# Patient Record
Sex: MALE | Race: WHITE | NOT HISPANIC OR LATINO | Employment: OTHER | URBAN - NONMETROPOLITAN AREA
[De-identification: names, ages, dates, MRNs, and addresses within clinical notes are randomized per-mention and may not be internally consistent; named-entity substitution may affect disease eponyms.]

---

## 2017-03-11 ENCOUNTER — OFFICE VISIT (OUTPATIENT)
Dept: URGENT CARE | Facility: CLINIC | Age: 77
End: 2017-03-11
Payer: MEDICARE

## 2017-03-11 PROCEDURE — 99203 OFFICE O/P NEW LOW 30 MIN: CPT

## 2017-03-11 PROCEDURE — G0463 HOSPITAL OUTPT CLINIC VISIT: HCPCS

## 2017-03-11 PROCEDURE — 69210 REMOVE IMPACTED EAR WAX UNI: CPT

## 2024-04-08 ENCOUNTER — HOSPITAL ENCOUNTER (EMERGENCY)
Facility: HOSPITAL | Age: 84
Discharge: HOME | End: 2024-04-08
Attending: EMERGENCY MEDICINE
Payer: MEDICARE

## 2024-04-08 ENCOUNTER — APPOINTMENT (EMERGENCY)
Dept: RADIOLOGY | Facility: HOSPITAL | Age: 84
End: 2024-04-08
Payer: MEDICARE

## 2024-04-08 ENCOUNTER — APPOINTMENT (EMERGENCY)
Dept: CT IMAGING | Facility: HOSPITAL | Age: 84
End: 2024-04-08
Payer: MEDICARE

## 2024-04-08 VITALS
HEART RATE: 94 BPM | WEIGHT: 126.1 LBS | DIASTOLIC BLOOD PRESSURE: 72 MMHG | SYSTOLIC BLOOD PRESSURE: 160 MMHG | OXYGEN SATURATION: 98 % | TEMPERATURE: 98.6 F | RESPIRATION RATE: 15 BRPM

## 2024-04-08 DIAGNOSIS — S90.32XA CONTUSION OF LEFT FOOT, INITIAL ENCOUNTER: ICD-10-CM

## 2024-04-08 DIAGNOSIS — F03.90 DEMENTIA (HCC): ICD-10-CM

## 2024-04-08 DIAGNOSIS — M79.672 LEFT FOOT PAIN: Primary | ICD-10-CM

## 2024-04-08 LAB
ALBUMIN SERPL BCP-MCNC: 3.9 G/DL (ref 3.5–5)
ALP SERPL-CCNC: 71 U/L (ref 34–104)
ALT SERPL W P-5'-P-CCNC: 14 U/L (ref 7–52)
ANION GAP SERPL CALCULATED.3IONS-SCNC: 9 MMOL/L (ref 4–13)
AST SERPL W P-5'-P-CCNC: 22 U/L (ref 13–39)
ATRIAL RATE: 90 BPM
BASOPHILS # BLD AUTO: 0.02 THOUSANDS/ÂΜL (ref 0–0.1)
BASOPHILS NFR BLD AUTO: 0 % (ref 0–1)
BILIRUB SERPL-MCNC: 1.66 MG/DL (ref 0.2–1)
BUN SERPL-MCNC: 13 MG/DL (ref 5–25)
CALCIUM SERPL-MCNC: 9.1 MG/DL (ref 8.4–10.2)
CHLORIDE SERPL-SCNC: 103 MMOL/L (ref 96–108)
CO2 SERPL-SCNC: 28 MMOL/L (ref 21–32)
CREAT SERPL-MCNC: 0.66 MG/DL (ref 0.6–1.3)
EOSINOPHIL # BLD AUTO: 0.01 THOUSAND/ÂΜL (ref 0–0.61)
EOSINOPHIL NFR BLD AUTO: 0 % (ref 0–6)
ERYTHROCYTE [DISTWIDTH] IN BLOOD BY AUTOMATED COUNT: 12.4 % (ref 11.6–15.1)
GFR SERPL CREATININE-BSD FRML MDRD: 88 ML/MIN/1.73SQ M
GLUCOSE SERPL-MCNC: 113 MG/DL (ref 65–140)
HCT VFR BLD AUTO: 38.5 % (ref 36.5–49.3)
HGB BLD-MCNC: 12.7 G/DL (ref 12–17)
IMM GRANULOCYTES # BLD AUTO: 0.03 THOUSAND/UL (ref 0–0.2)
IMM GRANULOCYTES NFR BLD AUTO: 1 % (ref 0–2)
LYMPHOCYTES # BLD AUTO: 0.8 THOUSANDS/ÂΜL (ref 0.6–4.47)
LYMPHOCYTES NFR BLD AUTO: 12 % (ref 14–44)
MCH RBC QN AUTO: 36.3 PG (ref 26.8–34.3)
MCHC RBC AUTO-ENTMCNC: 33 G/DL (ref 31.4–37.4)
MCV RBC AUTO: 110 FL (ref 82–98)
MONOCYTES # BLD AUTO: 0.91 THOUSAND/ÂΜL (ref 0.17–1.22)
MONOCYTES NFR BLD AUTO: 14 % (ref 4–12)
NEUTROPHILS # BLD AUTO: 4.68 THOUSANDS/ÂΜL (ref 1.85–7.62)
NEUTS SEG NFR BLD AUTO: 73 % (ref 43–75)
NRBC BLD AUTO-RTO: 0 /100 WBCS
P AXIS: 93 DEGREES
PLATELET # BLD AUTO: 158 THOUSANDS/UL (ref 149–390)
PMV BLD AUTO: 10.5 FL (ref 8.9–12.7)
POTASSIUM SERPL-SCNC: 4 MMOL/L (ref 3.5–5.3)
PR INTERVAL: 174 MS
PROT SERPL-MCNC: 6.4 G/DL (ref 6.4–8.4)
QRS AXIS: 67 DEGREES
QRSD INTERVAL: 62 MS
QT INTERVAL: 364 MS
QTC INTERVAL: 445 MS
RBC # BLD AUTO: 3.5 MILLION/UL (ref 3.88–5.62)
SODIUM SERPL-SCNC: 140 MMOL/L (ref 135–147)
T WAVE AXIS: 80 DEGREES
VENTRICULAR RATE: 90 BPM
WBC # BLD AUTO: 6.45 THOUSAND/UL (ref 4.31–10.16)

## 2024-04-08 PROCEDURE — 36415 COLL VENOUS BLD VENIPUNCTURE: CPT

## 2024-04-08 PROCEDURE — 93010 ELECTROCARDIOGRAM REPORT: CPT | Performed by: INTERNAL MEDICINE

## 2024-04-08 PROCEDURE — 80053 COMPREHEN METABOLIC PANEL: CPT

## 2024-04-08 PROCEDURE — 73630 X-RAY EXAM OF FOOT: CPT

## 2024-04-08 PROCEDURE — 93005 ELECTROCARDIOGRAM TRACING: CPT

## 2024-04-08 PROCEDURE — 70450 CT HEAD/BRAIN W/O DYE: CPT

## 2024-04-08 PROCEDURE — 85025 COMPLETE CBC W/AUTO DIFF WBC: CPT

## 2024-04-08 PROCEDURE — 71045 X-RAY EXAM CHEST 1 VIEW: CPT

## 2024-04-08 PROCEDURE — 72125 CT NECK SPINE W/O DYE: CPT

## 2024-04-08 PROCEDURE — 73564 X-RAY EXAM KNEE 4 OR MORE: CPT

## 2024-04-08 NOTE — DISCHARGE INSTRUCTIONS
Immediately return to the emergency room if you experience any new or worsening symptoms or if the symptoms are lasting longer than expected.     Please get reestablished with a family doctor.

## 2024-04-08 NOTE — ED ATTENDING ATTESTATION
"4/8/2024  I, Petar Christian Jr, DO, saw and evaluated the patient. I have discussed the patient with the resident/non-physician practitioner and agree with the resident's/non-physician practitioner's findings, Plan of Care, and MDM as documented in the resident's/non-physician practitioner's note, except where noted. All available labs and Radiology studies were reviewed.  I was present for key portions of any procedure(s) performed by the resident/non-physician practitioner and I was immediately available to provide assistance.       At this point I agree with the current assessment done in the Emergency Department.  I have conducted an independent evaluation of this patient a history and physical is as follows:    I supervised the Advanced Practitioner.? I performed, in its entirety, the history component of the visit.  I agree with the Advanced Practitioner's note with the following additions/exceptions:      Patient is a 83-year-old male presenting secondary to a bruise of the left foot as well as left elbow.  The patient does have dementia, and the patient's wife went out to the store to find him sitting on the couch with the water still running in the sink as well as Not being able to describe what exactly happened.  Patient's wife then noted this morning that she saw these bruised areas.  The patient was brought to the ER for evaluation.  With regards to his mental status, patient's wife states that he is \"status quo.\"    Heart is regular rate and rhythm without clicks rubs or gallops and lungs are coarse without wheezes rales or rhonchi.  The patient is overall demented and is frail in appearance.    Left foot is tender over the dorsum and lateral aspect.    Petar Christian Jr, DO 04/08/24     Petar Christian D.O.     ED Course         Critical Care Time  Procedures      "

## 2024-04-08 NOTE — ED PROVIDER NOTES
History  Chief Complaint   Patient presents with    Knee Pain     Patient presents c/o of left knee pain. Patient unable to report if he hit his head. Patient has bruising to the left elbow however denies pain. Bruising and swelling present in the left foot. Patient's wife arrived at bedside, reporting she noticed left leg and arm bruising and swelling this morning. Wife reports the patient had an unwitnessed fall while she was away at [employer].     Patient is a 83-year-old male with relevant past medical history of anemia, Alzheimer's, hypertension, and hearing loss presenting via emergency medicine services with left foot pain. Wife is at the bedside and reports that she came home from [employer] yesterday and didn't notice anything different but her  was at home by himself while she was at [employer]. Her  woke up this morning and she noticed bruising and swelling of the patient's left foot and ankle. She thinks he fell yesterday while she was at [employer]. She thinks she would have noticed the foot bruising yesterday but he was wearing socks all day. Patient arrives with a chief complaint of mild left foot pain, bruising, and swelling. Patient is unsure how he fell but has a history of Alzheimer's. He is at baseline mental status according to his wife. He is alert and oriented to person, place, and event. He does not take any medications including anticoagulants. He does not think he hit his head but is not totally sure. Patient has not seen a doctor since 2014, but has been feeling well prior to this. Patient denies fever, chills, headache, dizziness, lightheadedness, numbness, tingling, weakness, visual changes, chest pain, shortness of breath, abdominal pain, nausea, vomiting, constipation, diarrhea, dysuria, or other urinary symptoms.       History provided by:  Patient and spouse   used: No        None       Past Medical History:   Diagnosis Date    Dementia (HCC)        History  reviewed. No pertinent surgical history.    History reviewed. No pertinent family history.  I have reviewed and agree with the history as documented.    E-Cigarette/Vaping     E-Cigarette/Vaping Substances     Social History     Tobacco Use    Smoking status: Never    Smokeless tobacco: Never   Substance Use Topics    Alcohol use: Yes     Comment: occasional    Drug use: Not Currently       Review of Systems   Constitutional:  Negative for chills and fever.   HENT:  Negative for congestion, ear pain, rhinorrhea and sore throat.    Eyes:  Negative for pain and visual disturbance.   Respiratory:  Negative for cough and shortness of breath.    Cardiovascular:  Negative for chest pain and palpitations.   Gastrointestinal:  Negative for abdominal pain, constipation, diarrhea, nausea and vomiting.   Genitourinary:  Negative for dysuria, frequency, hematuria and urgency.   Musculoskeletal:  Negative for arthralgias and back pain.        Left foot pain, bruising, and swelling   Skin:  Negative for color change and rash.   Neurological:  Negative for dizziness, seizures, syncope, light-headedness and headaches.   Psychiatric/Behavioral:  Negative for agitation and confusion.        Physical Exam  Physical Exam  Vitals and nursing note reviewed.   Constitutional:       General: He is not in acute distress.     Appearance: He is well-developed.   HENT:      Head: Normocephalic and atraumatic.   Eyes:      Conjunctiva/sclera: Conjunctivae normal.   Cardiovascular:      Rate and Rhythm: Normal rate and regular rhythm.      Heart sounds: No murmur heard.  Pulmonary:      Effort: Pulmonary effort is normal. No respiratory distress.      Breath sounds: Normal breath sounds.   Abdominal:      Palpations: Abdomen is soft.      Tenderness: There is no abdominal tenderness.   Musculoskeletal:         General: No swelling.      Cervical back: Neck supple.      Left knee: Normal.      Left ankle: Swelling present.      Right foot:  Normal.      Left foot: Swelling present.      Comments: Ecchymosis and swelling noted to the patient's left ankle and foot. Left foot is neurovascularly intact. Patient has normal range of motion of this extremity including his toes.    Small area of ecchymosis noted to the patient's left elbow but no tenderness or bleeding. Left arm is neurovascularly intact.   Skin:     General: Skin is warm and dry.      Capillary Refill: Capillary refill takes less than 2 seconds.   Neurological:      Mental Status: He is alert and oriented to person, place, and time. Mental status is at baseline.   Psychiatric:         Mood and Affect: Mood normal.         Vital Signs  ED Triage Vitals [04/08/24 1051]   Temperature Pulse Respirations Blood Pressure SpO2   98.6 °F (37 °C) 87 18 (!) 175/81 98 %      Temp Source Heart Rate Source Patient Position - Orthostatic VS BP Location FiO2 (%)   Tympanic Monitor Sitting -- --      Pain Score       4           Vitals:    04/08/24 1051 04/08/24 1130 04/08/24 1200   BP: (!) 175/81 160/73 160/72   Pulse: 87 83 94   Patient Position - Orthostatic VS: Sitting Sitting Lying         Visual Acuity  Visual Acuity      Flowsheet Row Most Recent Value   L Pupil Size (mm) 4   R Pupil Size (mm) 4            ED Medications  Medications - No data to display    Diagnostic Studies  Results Reviewed       Procedure Component Value Units Date/Time    Comprehensive metabolic panel [[id number]]  (Abnormal) Collected: 04/08/24 1124    Lab Status: Final result Specimen: Blood from Arm, Left Updated: 04/08/24 1149     Sodium 140 mmol/L      Potassium 4.0 mmol/L      Chloride 103 mmol/L      CO2 28 mmol/L      ANION GAP 9 mmol/L      BUN 13 mg/dL      Creatinine 0.66 mg/dL      Glucose 113 mg/dL      Calcium 9.1 mg/dL      AST 22 U/L      ALT 14 U/L      Alkaline Phosphatase 71 U/L      Total Protein 6.4 g/dL      Albumin 3.9 g/dL      Total Bilirubin 1.66 mg/dL      eGFR 88 ml/min/1.73sq m     Narrative:       National Kid[employer] Foundation guidelines for Chronic Kidney Disease (CKD):     Stage 1 with normal or high GFR (GFR > 90 mL/min/1.73 square meters)    Stage 2 Mild CKD (GFR = 60-89 mL/min/1.73 square meters)    Stage 3A Moderate CKD (GFR = 45-59 mL/min/1.73 square meters)    Stage 3B Moderate CKD (GFR = 30-44 mL/min/1.73 square meters)    Stage 4 Severe CKD (GFR = 15-29 mL/min/1.73 square meters)    Stage 5 End Stage CKD (GFR <15 mL/min/1.73 square meters)  Note: GFR calculation is accurate only with a steady state creatinine    CBC and differential [[id number]]  (Abnormal) Collected: 04/08/24 1124    Lab Status: Final result Specimen: Blood from Arm, Left Updated: 04/08/24 1129     WBC 6.45 Thousand/uL      RBC 3.50 Million/uL      Hemoglobin 12.7 g/dL      Hematocrit 38.5 %       fL      MCH 36.3 pg      MCHC 33.0 g/dL      RDW 12.4 %      MPV 10.5 fL      Platelets 158 Thousands/uL      nRBC 0 /100 WBCs      Neutrophils Relative 73 %      Immature Grans % 1 %      Lymphocytes Relative 12 %      Monocytes Relative 14 %      Eosinophils Relative 0 %      Basophils Relative 0 %      Neutrophils Absolute 4.68 Thousands/µL      Absolute Immature Grans 0.03 Thousand/uL      Absolute Lymphocytes 0.80 Thousands/µL      Absolute Monocytes 0.91 Thousand/µL      Eosinophils Absolute 0.01 Thousand/µL      Basophils Absolute 0.02 Thousands/µL                    XR chest 1 view portable   ED Interpretation by Kenneth Gibson PA-C (04/08 1302)   No acute osseous abnormality.      CT cervical spine without contrast   Final Result by Everardo Mathews DO (04/08 1243)   No cervical spine fracture or traumatic malalignment.                  Workstation performed: [id number]         CT head without contrast   Final Result by Everardo Mathews DO (04/08 1235)   No acute intracranial abnormality.                  Workstation performed: [id number]         XR foot 3+ views LEFT   Final Result by Brennan Contreras MD  (04/08 1307)      No acute osseous abnormality.      Workstation performed: [id number]         XR knee 4+ vw left injury   ED Interpretation by Kenneht Gibson PA-C (04/08 1333)   No acute osseous abnormality.                 Procedures  ECG 12 Lead Documentation Only    Date/Time: 4/8/2024 11:23 AM    Performed by: Kenneth Gibson PA-C  Authorized by: Kenneth Gibson PA-C    Indications / Diagnosis:  Fall at home  ECG reviewed by me, the ED Provider: yes    Patient location:  ED  Previous ECG:     Comparison to cardiac monitor: Yes    Interpretation:     Interpretation: abnormal    Rate:     ECG rate:  90  Rhythm:     Rhythm: sinus rhythm    Ectopy:     Ectopy: PAC    QRS:     QRS axis:  Normal    QRS intervals:  Normal  Conduction:     Conduction: normal    ST segments:     ST segments:  Normal  T waves:     T waves: normal             ED Course  ED Course as of 04/08/24 1333   Mon Apr 08, 2024   1053 Vital signs reviewed and within normal limits other than elevated blood pressure. Will repeat this.   1130 CBC and differential(!)  No leukocytosis, anemia, or platelet abnormality.   1207 Comprehensive metabolic panel(!)  Electrolytes within normal limits. No KRISTINA or glucose abnormality. No transaminitis.   1222 Went over results thus far with patient. He is resting comfortably and has no further complaints.   1259 CT head without contrast  IMPRESSION:  No acute intracranial abnormality.   1300 CT cervical spine without contrast  IMPRESSION:  No cervical spine fracture or traumatic malalignment.   1301 Called down to the radiology reading room again to get the left foot x-ray read.   1313 XR foot 3+ views LEFT  IMPRESSION:  No acute osseous abnormality.   1315 Went over results with patient and wife. Patient is eager to go home. Will discharge home with strict return precautions and an ambulatory referral for family medicine since he has not seen a doctor in a number years.                               SBIRT  22yo+      Flowsheet Row Most Recent Value   Initial Alcohol Screen: US AUDIT-C     1. How often do you have a drink containing alcohol? 1 Filed at: 04/08/2024 1057   2. How many drinks containing alcohol do you have on a typical day you are drinking?  0 Filed at: 04/08/2024 1057   3a. Male UNDER 65: How often do you have five or more drinks on one occasion? 0 Filed at: 04/08/2024 1057   3b. FEMALE Any Age, or MALE 65+: How often do you have 4 or more drinks on one occassion? 0 Filed at: 04/08/2024 1057   Audit-C Score 1 Filed at: 04/08/2024 1057   [place]: How many times in the past year have you...    Used an illegal drug or used a prescription medication for non-medical reasons? Never Filed at: 04/08/2024 1057                      Medical Decision Making  Patient is a 83-year-old male with relevant past medical history of anemia, Alzheimer's, hypertension, and hearing loss presenting via emergency medicine services with left foot pain. Patient has ecchymosis and swelling to his left foot and ankle.  Baseline labs, chest xray, and EKG to help rule out cause for his fall.  CT head without contrast and CT cervical spine due to fall yesterday and he is not sure how he fell. Trauma evaluation not necessary as he is not on blood thinners. X-ray left foot to rule out foot or ankle fracture and x-ray of left knee to rule out Maisonneuve fracture. I do not appreciate any acute findings upon my interpretation. I had radiology perform a final read.  See ED course for interpretation of labs, imaging, and further medical decision making.   Offered patient something for his left foot pain but he declined. Ice was applied. Provided supportive care instructions on managing his left foot contusion.  Dispo: Patient discharged home with strict return precautions. Advised patient to return to the nearest emergency room if he has new or worsening symptoms. Advised patient to get reestablished with a family doctor. Patient and wife are  satisfied with care and agree with management and plan.     Amount and/or Complexity of Data Reviewed  External Data Reviewed: labs, radiology and notes.  Labs: ordered. Decision-making details documented in ED Course.  Radiology: ordered and independent interpretation performed. Decision-making details documented in ED Course.  ECG/medicine tests: ordered and independent interpretation performed.             Disposition  Final diagnoses:   Left foot pain   Contusion of left foot, initial encounter   Dementia (HCC)     Time reflects when diagnosis was documented in both MDM as applicable and the Disposition within this note       Time User Action Codes Description Comment    4/8/2024  1:03 PM Kenneth Gibson Add [M79.672] Left foot pain     4/8/2024  1:13 PM Kenneth Gibson Add [S90.32XA] Contusion of left foot, initial encounter     4/8/2024  1:14 PM Kenneth Gibson Add [F03.90] Dementia (HCC)           ED Disposition       ED Disposition   Discharge    Condition   Stable    Date/Time   Mon Apr 8, 2024 1313    Comment   Aleksey Triplett discharge to home/self care.                   Follow-up Information       Follow up With Specialties Details Why Contact Info Additional Information    [facility] Emergency Department Emergency Medicine Go to  If symptoms worsen [address] [facility] [address] [phone] [facility] Emergency Department, [address], [place] [address]            Patient's Medications    No medications on file           PDMP Review       None            ED Provider  Electronically Signed by             Kenneth Gibson PA-C  04/08/24 7397